# Patient Record
Sex: FEMALE | Race: OTHER | Employment: UNEMPLOYED | URBAN - METROPOLITAN AREA
[De-identification: names, ages, dates, MRNs, and addresses within clinical notes are randomized per-mention and may not be internally consistent; named-entity substitution may affect disease eponyms.]

---

## 2020-10-12 ENCOUNTER — OFFICE VISIT (OUTPATIENT)
Dept: PSYCHIATRY | Facility: CLINIC | Age: 25
End: 2020-10-12
Payer: MEDICAID

## 2020-10-12 VITALS — HEART RATE: 87 BPM | SYSTOLIC BLOOD PRESSURE: 131 MMHG | DIASTOLIC BLOOD PRESSURE: 73 MMHG

## 2020-10-12 DIAGNOSIS — Z51.81 MEDICATION MONITORING ENCOUNTER: ICD-10-CM

## 2020-10-12 DIAGNOSIS — F64.0 GENDER DYSPHORIA IN ADOLESCENT AND ADULT: ICD-10-CM

## 2020-10-12 DIAGNOSIS — F41.1 GENERALIZED ANXIETY DISORDER: ICD-10-CM

## 2020-10-12 DIAGNOSIS — F41.1 GAD (GENERALIZED ANXIETY DISORDER): ICD-10-CM

## 2020-10-12 DIAGNOSIS — F32.1 DEPRESSION, MAJOR, SINGLE EPISODE, MODERATE: Primary | ICD-10-CM

## 2020-10-12 DIAGNOSIS — F40.10 SOCIAL ANXIETY DISORDER: ICD-10-CM

## 2020-10-12 PROCEDURE — 90792 PSYCH DIAG EVAL W/MED SRVCS: CPT | Mod: SA,HB,, | Performed by: NURSE PRACTITIONER

## 2020-10-12 PROCEDURE — 99999 PR PBB SHADOW E&M-NEW PATIENT-LVL II: ICD-10-PCS | Mod: PBBFAC,,, | Performed by: NURSE PRACTITIONER

## 2020-10-12 PROCEDURE — 99999 PR PBB SHADOW E&M-NEW PATIENT-LVL II: CPT | Mod: PBBFAC,,, | Performed by: NURSE PRACTITIONER

## 2020-10-12 PROCEDURE — 99202 OFFICE O/P NEW SF 15 MIN: CPT | Mod: PBBFAC | Performed by: NURSE PRACTITIONER

## 2020-10-12 PROCEDURE — 90792 PR PSYCHIATRIC DIAGNOSTIC EVALUATION W/MEDICAL SERVICES: ICD-10-PCS | Mod: SA,HB,, | Performed by: NURSE PRACTITIONER

## 2020-10-12 RX ORDER — PROPRANOLOL HYDROCHLORIDE 10 MG/1
10 TABLET ORAL 3 TIMES DAILY
Qty: 90 TABLET | Refills: 1 | Status: SHIPPED | OUTPATIENT
Start: 2020-10-12 | End: 2020-12-11

## 2020-10-12 RX ORDER — PROPRANOLOL HYDROCHLORIDE 10 MG/1
10 TABLET ORAL 3 TIMES DAILY
Qty: 90 TABLET | Refills: 11 | Status: SHIPPED | OUTPATIENT
Start: 2020-10-12 | End: 2020-10-12

## 2020-10-12 RX ORDER — SERTRALINE HYDROCHLORIDE 25 MG/1
25 TABLET, FILM COATED ORAL DAILY
Qty: 30 TABLET | Refills: 1 | Status: SHIPPED | OUTPATIENT
Start: 2020-10-12 | End: 2020-10-30

## 2020-10-12 NOTE — PATIENT INSTRUCTIONS
Understanding Anxiety Disorders  Almost everyone gets nervous now and then. Its normal to have knots in your stomach before a test, or for your heart to race on a first date. But an anxiety disorder is much more than a case of nerves. In fact, its symptoms may be overwhelming. But treatment can relieve many of these symptoms. Talking to your healthcare provider is the first step.    What are anxiety disorders?  An anxiety disorder causes intense feelings of panic and fear. These feelings may arise for no apparent reason. And they tend to recur again and again. They may prevent you from coping with life and cause you great distress. As a result, you may avoid anything that triggers your fear. In extreme cases, you may never leave the house. Anxiety disorders may cause other symptoms, such as:  · Obsessive thoughts you cant control  · Constant nightmares or painful thoughts of the past  · Nausea, sweating, and muscle tension  · Trouble sleeping or concentrating  What causes anxiety disorders?  Anxiety disorders tend to run in families. For some people, childhood abuse or neglect may play a role. For others, stressful life events or trauma may trigger anxiety disorders. Anxiety can trigger low self-esteem and poor coping skills.  Common anxiety disorders  · Panic disorder. This causes an intense fear of being in danger.  · Phobias. These are extreme fears of certain objects, places, or events.  · Obsessive-compulsive disorder. This causes you to have unwanted thoughts and urges. You also may perform certain actions over and over.  · Posttraumatic stress disorder. This occurs in people who have survived a terrible ordeal. It can cause nightmares and flashbacks about the event.  · Generalized anxiety disorder. This causes constant worry that can greatly disrupt your life.   Getting better  You may believe that nothing can help you. Or, you might fear what others may think. But most anxiety symptoms can be eased.  Having an anxiety disorder is nothing to be ashamed of. Most people do best with treatment that combines medicine and therapy. These arent cures. But they can help you live a healthier life.  Date Last Reviewed: 2/1/2017 © 2000-2017 [employer]. [address] [place] [address], [place] [address]. All rights reserved. This information is not intended as a substitute for professional medical care. Always follow your healthcare professional's instructions.          Depression  Depression is one of the most common mental health problems today. It is not just a state of unhappiness or sadness. It is a true disease. The cause seems to be related to a decrease in chemicals that transmit signals in the brain. Having a family history of depression, alcoholism, or suicide increases the risk. Chronic illness, chronic pain, migraine headaches and high emotional stress also increase the risk.  Depression is something we tend to recognize in others, but may have a hard time seeing in ourselves. It can show in many physical and emotional ways:  · Loss of appetite  · Over-eating  · Not being able to sleep  · Sleeping too much  · Tiredness not related to physical exertion  · Restlessness or irritability  · Slowness of movement or speech  · Feeling depressed or withdrawn  · Loss of interest in things you once enjoyed  · Trouble concentrating, poor memory, trouble making decisions  · Thoughts of harming or killing oneself, or thoughts that life is not worth living  · Low self-esteem  The treatment for depression may include both medicine and psychotherapy. Antidepressants can reduce suffering and can improve the ability to function during the depressed period. Therapy can offer emotional support and help you understand emotional factors that may be causing the depression.  Home care  · On-going care and support helps people manage this disease.  Find a healthcare provider and therapist who meet your needs. Seek help when you  feel like you may be getting ill.  · Be kind to yourself. Make it a point to do things that you enjoy (gardening, walking in nature, going to a movie, etc.). Reward yourself for small successes.  · Take care of your physical body. Eat a balanced diet (low in saturated fat and high in fruits and vegetables). Exercise at least 3 times a week for 30 minutes. Even mild-moderate exercise (like brisk walking) can make you feel better.  · Avoid alcohol, which can make depression worse.  · Take medicine as prescribed.  · Tell each of your healthcare providers about all of the prescription drugs, over-the-counter medicines, vitamins, and supplements you take. Certain supplements interact with medicines and can result in dangerous side effects. Ask your pharmacist when you have questions about drug interactions.  · Talk with your family and trusted friends about your feelings and thoughts. Ask them to help you recognize behavior changes early so you can get help and, if needed, medicine can be adjusted.  Follow-up care  Follow up with your healthcare provider, or as advised.  Call 911  Call 911 if you:  · Have suicidal thoughts, a suicide plan, and the means to carry out the plan  · Have trouble breathing  · Are very confused  · Feel very drowsy or have trouble awakening  · Faint or lose consciousness  · Have new chest pain that becomes more severe, lasts longer, or spreads into your shoulder, arm, neck, jaw or back  When to seek medical advice  Call your healthcare provider right away if any of these occur:  · Feeling extreme depression, fear, anxiety, or anger toward yourself or others  · Feeling out of control  · Feeling that you may try to harm yourself or another  · Hearing voices that others do not hear  · Seeing things that others do not see  · Cant sleep or eat for 3 days in a row  · Friends or family express concern over your behavior and ask you to seek help  Date Last Reviewed: 9/29/2015  © 1458-4626 [employer]. [address] [place] [address], [place] [address]. All rights reserved. This information is not intended as a substitute for professional medical care. Always follow your healthcare professional's instructions.

## 2020-10-12 NOTE — PROGRESS NOTES
"Outpatient Psychiatry Initial Visit (MD/NP)      Disclaimer: Evaluation and treatment is based on information presented to date. Any new information may affect assessment and findings.         Note: I reviewed Epic EHR_"Encounters" Info for general background info.     reviewed this date            10/12/2020    Tiago Laureano, a 25 y.o. female, presenting for initial evaluation visit. Met with patient.    Reason for Encounter: self-referral. " depression and anxiety"    History of Present Illness:  Depression: endorses that she has been transgender since the start of puberty.  She is wanting to have surgeries and take next steps to become male gender.  Came out to family recently.  They were accepting, " confused"      States they are not hostile.  Her sister is spoortive;  And understanding.   She has searched for medical facilities. Doesn't know where to start.  States as teen she had an incongruence with gender and this has lasted through adulthood.  Strong desire to be rid of her sex characteristics.  incongurence with primary characteristics.  Desire for sex characteristics of being a male.  Desire to be male gender.     Depression: days where she does not want to get out of bed; fatigue, " no interested in anything"  Anhedonia; want to stay at home all of the time.  Verbalizes social anxiety - " marciano always been a bit of an introverted person"  Begins to cry.  Low self esteem, feels worthless " sometimes"  Does not endorse hopelessness.  Denies suicidal or homicidal ideations.    Anxiety:  " I feel choked up; feel it's hard time, and I just want to go into myself"  Hyperarousal sx, worrying all of the time. Panic attacks; caused her to drop out of school; problems concentrating and interrupts sleep, waking her up with panic attack.   Went to 9th grade; she is working on her GED  Not working at job    She does not have close relationship with mom " she just came back into my life"  Grandmother raised " "her.  Mother is controlling and " tries to take over my care and everything"    Mother brought her to this visit.                  Symptom Clusters:  Depressive Disorder: REPORTS depressed mood, sleep change, tired/fatigued, worthlessness, guilt, concentration problems.   Anxiety Disorder: panic attacks, hyperarousal symptoms, muscle tension, sleep problems, concentration problems, excessive worry.   Manic Disorder: DENIES none.   Psychotic Disorder: DENIES none.   Substance Use:  DENIES none.   Physical or Sexual Abuse: DENIES none.       Review Of Systems:     Review of Systems   Constitutional: Negative.  Negative for activity change, appetite change and unexpected weight change.   HENT: Negative.    Eyes: Negative.  Negative for visual disturbance.   Respiratory: Negative.    Cardiovascular: Negative.    Gastrointestinal: Negative.  Negative for constipation, diarrhea and nausea.   Endocrine: Negative.    Genitourinary: Negative.    Musculoskeletal: Negative.    Skin: Negative.  Negative for color change.   Allergic/Immunologic: Negative.  Negative for food allergies.   Neurological: Negative.  Negative for dizziness, tremors, seizures, speech difficulty, weakness, light-headedness and headaches.   Hematological: Negative.    Psychiatric/Behavioral: Positive for dysphoric mood and sleep disturbance. Negative for agitation, behavioral problems, confusion, decreased concentration, hallucinations, self-injury and suicidal ideas. The patient is nervous/anxious. The patient is not hyperactive.        Current Evaluation:           Constitutional  Vitals:  Most recent vital signs, dated less than 90 days prior to this appointment, were reviewed.      Last 3 sets of Vitals    Vitals - 1 value per visit 10/12/2020   SYSTOLIC 131   DIASTOLIC 73   PULSE 87   VISIT REPORT -       General:  unremarkable, age appropriate, casually dressed, neatly groomed     Musculoskeletal  Muscle Strength/Tone:  not examined   Gait & " "Station:  non-ataxic     Psychiatric  Speech:  no latency; no press  Anxious, voice quivering when talking   Mood & Affect:  depressed, sad anxious    appropriate, anxious   Thought Process:  normal and logical, abstract, goal-directed   Associations:  intact   Thought Content:  normal, no suicidality, no homicidality, delusions, or paranoia   Insight:  intact, has awareness of illness   Judgement: behavior is adequate to circumstances, age appropriate   Orientation:  grossly intact, person, place, situation, time/date, day of week, month of year, year   Memory: intact for content of interview, able to remember recent events- yes, able to remember remote events- yes   Language: grossly intact   Attention Span & Concentration:  distracted   Fund of Knowledge:  intact and appropriate to age and level of education, familiar with aspects of current personal life       Relevant Elements of Neurological Exam: normal gait        Psychiatric history:   none    Previous Suicide Attempt: denies    Previous Psychotropic Medications: denies    Medical history: denies      Family history of psychiatric illness: " I don't know, something is wrong with my mom, she acts all over the place"      Social history (marriage, employment, etc.): from .  Raised by grandmother.  Spent most of time with grandmother.  She has reconnected with mother. Not  no children.  Not working.  [employer]  9th grade dropped out of school bc of anxiety.  Mother was not in her life until recently.  She has issues with mom.  Argues with her, controlling        Legal: denies      History of Physical or Sexual Abuse:  denies history or current physical/sexual abuse      Support System: family, close friends, significant other    Substance Abuse History:    Use of Alcohol: drinks socially, no concerns  Denies other substances      Laboratory Data  No visits with results within 1 Month(s) from this visit.   Latest known visit with results is:   Orders " Only on 04/05/2013   Component Date Value Ref Range Status    Strep A Culture 04/05/2013 NEGATIVE FOR GROUP A STREP   Final         Medications  Outpatient Encounter Medications as of 10/12/2020   Medication Sig Dispense Refill    propranoloL (INDERAL) 10 MG tablet Take 1 tablet (10 mg total) by mouth 3 (three) times daily. 90 tablet 1    sertraline (ZOLOFT) 25 MG tablet Take 1 tablet (25 mg total) by mouth once daily. 30 tablet 1    [DISCONTINUED] propranoloL (INDERAL) 10 MG tablet Take 1 tablet (10 mg total) by mouth 3 (three) times daily. 90 tablet 11     No facility-administered encounter medications on file as of 10/12/2020.              Assessment - Diagnosis - Goals:     Impression:     Encounter Diagnoses   Name Primary?    Social anxiety disorder     Depression, major, single episode, moderate Yes    Generalized anxiety disorder     Medication monitoring encounter     PAUL (generalized anxiety disorder)     Gender dysphoria in adolescent and adult          Strengths and Liabilities: Strength: Patient accepts guidance/feedback, Strength: Patient is expressive/articulate., Strength: Patient is intelligent., Strength: Patient is motivated for change., Strength: Patient is physically healthy., Strength: Patient has reasonable judgment., Strength: Patient is stable., Liability: Patient lacks coping skills.    Treatment Goals:  Specify outcomes written in observable, behavioral terms:   Anxiety: reducing negative automatic thoughts, reducing physical symptoms of anxiety and reducing time spent worrying (<30 minutes/day)  Depression: increasing energy, increasing interest in usual activities, increasing self-reward for positive behaviors (one/day), reducing excessive guilt and reducing negative automatic thoughts    Treatment Plan/Recommendations:   · Medication Management: The risks and benefits of medication were discussed with the patient.  · Referral for further treatment to social work team for  psychotherapy  · Labs: see orders  · The treatment plan and follow up plan were reviewed with the patient.   · Start zoloft 25 mg  · Propranolol 10 mg tid prn    Depression, major, single episode, moderate  -     sertraline (ZOLOFT) 25 MG tablet; Take 1 tablet (25 mg total) by mouth once daily.  Dispense: 30 tablet; Refill: 1    Social anxiety disorder  -     sertraline (ZOLOFT) 25 MG tablet; Take 1 tablet (25 mg total) by mouth once daily.  Dispense: 30 tablet; Refill: 1  -     Discontinue: propranoloL (INDERAL) 10 MG tablet; Take 1 tablet (10 mg total) by mouth 3 (three) times daily.  Dispense: 90 tablet; Refill: 11  -     propranoloL (INDERAL) 10 MG tablet; Take 1 tablet (10 mg total) by mouth 3 (three) times daily.  Dispense: 90 tablet; Refill: 1    Generalized anxiety disorder  -     sertraline (ZOLOFT) 25 MG tablet; Take 1 tablet (25 mg total) by mouth once daily.  Dispense: 30 tablet; Refill: 1    Medication monitoring encounter  -     Comprehensive Metabolic Panel; Future; Expected date: 10/12/2020  -     CBC auto differential; Future; Expected date: 10/19/2020  -     DRUGS OF ABUSE SCREEN, BLOOD; Future; Expected date: 10/12/2020  -     Pregnancy, urine rapid  -     TSH; Future; Expected date: 10/12/2020  -     Lipid Panel; Future; Expected date: 10/12/2020    PAUL (generalized anxiety disorder)    Gender dysphoria in adolescent and adult      .    Return to Clinic: 1 week    Reviewed pathophysiology of depression and anxiety. Discussed rationale behind treatment. Reviewed treatment options, including medication and psychotherapy. Reviewed pharmacology including onset of action, dosing, side effects, adverse reactions and duration of therapy (6-12 months).  Discussed the need to stay on medication if it is effective and not discontinue after a few weeks due to the probability of relapse.    Pt expressed appreciation for the visit today and did not have further question at this time though pt  was still  informed to:     Call / Walk In if problems.    Call Report Side Effects to Psyc MD     Encouraged to follow up with primary care / Gen Med MD for continued monitoring of general health and wellness.    Call 911  Or go to ER if Acute Concerns (especially if any thoughts of harm to self or other).     Understanding was expressed; and no further concerns nor questions were raised at this time.

## 2020-10-26 ENCOUNTER — TELEPHONE (OUTPATIENT)
Dept: PSYCHIATRY | Facility: CLINIC | Age: 25
End: 2020-10-26

## 2020-10-26 NOTE — TELEPHONE ENCOUNTER
----- Message from Marcathysanjuanita  sent at 10/26/2020  4:25 PM CDT -----  Type:  Sooner Apoointment Request    Caller is requesting a sooner appointment.  Caller declined first available appointment listed below.  Caller will not accept being placed on the waitlist and is requesting a message be sent to doctor.  Name of Caller:Pt  When is the first available appointment?  Symptoms:  Would the patient rather a call back or a response via [facility]?   Best Call Back Number: [phone]  Additional Information:   Pt is requesting appt for Friday

## 2020-10-26 NOTE — TELEPHONE ENCOUNTER
Pt calling in regard to keeping an appt with Angie Mroe tomorrow.  Pt states she spoke with Sara today at the clinic and Sara was going to call her back re this issue.  I have left a msg for Sara to call pt in the morning as she is already gone for the day.

## 2020-10-26 NOTE — TELEPHONE ENCOUNTER
----- Message from Marcathysanjuanita  sent at 10/26/2020  4:25 PM CDT -----  Type:  Sooner Apoointment Request    Caller is requesting a sooner appointment.  Caller declined first available appointment listed below.  Caller will not accept being placed on the waitlist and is requesting a message be sent to doctor.  Name of Caller:Pt  When is the first available appointment?  Symptoms:  Would the patient rather a call back or a response via [facility]?   Best Call Back Number: [phone]  Additional Information:   Pt is requesting appt for Friday

## 2020-10-26 NOTE — TELEPHONE ENCOUNTER
Attempted to reschedule today's appointment due to provider having an emergency. No answer detailed voice mail left. No my chart setup.

## 2020-10-26 NOTE — TELEPHONE ENCOUNTER
"Patient and her mother came into the clinic. This nurse attempted to contact the patient earlier without success to let her know the provider wasn't available,  my chart not setup. Patient's mother stated that' I am sick and tired of this [place] woman she is never at work, she prescribed my daughter some kind of blood pressure medicine that doesn't work, she doesn't have high blood pressure. I don't like that woman". Patient quiet with head down. Patient's mother stated that" Your grandmother wanted you to come here". This nurse offered the patient and her mother a different appointment, mother refused. Patient quiet. Mother speaking in a nasty tone of voice. Refused any type of reassurance per this nurse. Patient's mother stated that". Let's go [place] we are going to find you somewhere else to go"  Patient leaving the waiting area quiet, waved this nurse bye, and left with mother. This nurse notified [facility] clinical supervisor of the incident.   "

## 2020-10-27 ENCOUNTER — TELEPHONE (OUTPATIENT)
Dept: PSYCHIATRY | Facility: CLINIC | Age: 25
End: 2020-10-27

## 2020-10-27 NOTE — TELEPHONE ENCOUNTER
"This nurse spoke to the patient in regards to switching providers due to patient's mother not wanting the patient to see this current provider. Patient stated that" I want to see Ms. Colón you can reschedule me and in office visit with her". The patient's grandmother talking in the background stated that" My granddaughter wants to see Dr. Colón". Patient appointment rescheduled for this Friday morning. This nurse asked the patient about rescheduling with CHAITANYA Adams, now or on Friday. Patient stated that" I will reschedule the appointment when I come in on Friday morning". Patient speaking in calm tone of voice, grandmother in the background saying thank you in a calm tone of voice. No other complaints verbalized at this time. [facility] clinical supervisor made aware of issues with the family dynamics of the patient's mother and grandmother. The patient is own responsible party.   "

## 2020-10-27 NOTE — TELEPHONE ENCOUNTER
Patient was seen for initial visit on 10/12 with NP and patient.  Patient appeared to be pleased with care. The patient is of age where she can choose her provider. Patient did speak to family dynamic at first evaluation and concerns about mother being controlling. She is to follow with a therapist and these issues can also be addressed in therapy.

## 2020-10-30 ENCOUNTER — OFFICE VISIT (OUTPATIENT)
Dept: PSYCHIATRY | Facility: CLINIC | Age: 25
End: 2020-10-30
Payer: MEDICAID

## 2020-10-30 ENCOUNTER — TELEPHONE (OUTPATIENT)
Dept: PSYCHIATRY | Facility: CLINIC | Age: 25
End: 2020-10-30

## 2020-10-30 DIAGNOSIS — F32.1 DEPRESSION, MAJOR, SINGLE EPISODE, MODERATE: ICD-10-CM

## 2020-10-30 DIAGNOSIS — F41.1 GENERALIZED ANXIETY DISORDER: Primary | ICD-10-CM

## 2020-10-30 PROCEDURE — 99999 PR PBB SHADOW E&M-EST. PATIENT-LVL I: ICD-10-PCS | Mod: PBBFAC,,, | Performed by: NURSE PRACTITIONER

## 2020-10-30 PROCEDURE — 99211 OFF/OP EST MAY X REQ PHY/QHP: CPT | Mod: PBBFAC | Performed by: NURSE PRACTITIONER

## 2020-10-30 PROCEDURE — 99999 PR PBB SHADOW E&M-EST. PATIENT-LVL I: CPT | Mod: PBBFAC,,, | Performed by: NURSE PRACTITIONER

## 2020-10-30 PROCEDURE — 99214 PR OFFICE/OUTPT VISIT, EST, LEVL IV, 30-39 MIN: ICD-10-PCS | Mod: SA,HB,S$PBB, | Performed by: NURSE PRACTITIONER

## 2020-10-30 PROCEDURE — 99214 OFFICE O/P EST MOD 30 MIN: CPT | Mod: SA,HB,S$PBB, | Performed by: NURSE PRACTITIONER

## 2020-10-30 RX ORDER — SERTRALINE HYDROCHLORIDE 100 MG/1
100 TABLET, FILM COATED ORAL DAILY
Qty: 30 TABLET | Refills: 0 | Status: SHIPPED | OUTPATIENT
Start: 2020-10-30 | End: 2020-11-17 | Stop reason: SDUPTHER

## 2020-10-30 NOTE — PATIENT INSTRUCTIONS
Understanding Anxiety Disorders  Almost everyone gets nervous now and then. Its normal to have knots in your stomach before a test, or for your heart to race on a first date. But an anxiety disorder is much more than a case of nerves. In fact, its symptoms may be overwhelming. But treatment can relieve many of these symptoms. Talking to your healthcare provider is the first step.    What are anxiety disorders?  An anxiety disorder causes intense feelings of panic and fear. These feelings may arise for no apparent reason. And they tend to recur again and again. They may prevent you from coping with life and cause you great distress. As a result, you may avoid anything that triggers your fear. In extreme cases, you may never leave the house. Anxiety disorders may cause other symptoms, such as:  · Obsessive thoughts you cant control  · Constant nightmares or painful thoughts of the past  · Nausea, sweating, and muscle tension  · Trouble sleeping or concentrating  What causes anxiety disorders?  Anxiety disorders tend to run in families. For some people, childhood abuse or neglect may play a role. For others, stressful life events or trauma may trigger anxiety disorders. Anxiety can trigger low self-esteem and poor coping skills.  Common anxiety disorders  · Panic disorder. This causes an intense fear of being in danger.  · Phobias. These are extreme fears of certain objects, places, or events.  · Obsessive-compulsive disorder. This causes you to have unwanted thoughts and urges. You also may perform certain actions over and over.  · Posttraumatic stress disorder. This occurs in people who have survived a terrible ordeal. It can cause nightmares and flashbacks about the event.  · Generalized anxiety disorder. This causes constant worry that can greatly disrupt your life.   Getting better  You may believe that nothing can help you. Or, you might fear what others may think. But most anxiety symptoms can be eased.  Having an anxiety disorder is nothing to be ashamed of. Most people do best with treatment that combines medicine and therapy. These arent cures. But they can help you live a healthier life.  Date Last Reviewed: 2/1/2017  © 4681-4328 [employer]. [address] [place] [address], [place] [address]. All rights reserved. This information is not intended as a substitute for professional medical care. Always follow your healthcare professional's instructions.

## 2020-10-30 NOTE — PROGRESS NOTES
"Outpatient Psychiatry Follow-Up Visit (MD/NP)      Disclaimer: Evaluation and treatment is based on information presented to date. Any new information may affect assessment and findings.     Crisis Disclaimer: Patient was informed that due to the virtual nature of the visit, that if a crisis develops, protocols will be implemented to ensure patient safety, including but not limited to: 1) Initiating a welfare check with local Law Enforcement, 2) Calling 911/National Crisis Hotline, and/or 3) Initiating PEC/CEC procedures.         Clinical Status of Patient:  Outpatient (Ambulatory)    Chief Complaint:  Tiago Laureano is a 25 y.o. female who presents today for follow-up of depression and anxiety.  Met with patient.      Interval History and Content of Current Session:  Interim Events/Subjective Report/Content of Current Session: Patient's appt was rescheduled, mother came with her at that time and made a scene, behaving inappropriately.  Patient started this visit by apologizing.  Explained to patient she did not have to apologize for mother's behavior, that she is the patient and nothing will ever be discussed with mother.  Mother was not in patient's life until recently.  Raised by grandmother.   patient has worsened since last visit. Currently not working or attending school.  Does not want to go to school. Does not have GED.  She was unable to complete school due to anxiety. States she stays home, does not go anywhere even prior to virus.  She had a family function and did not want to go bc she was so anxious.  Took prorpanolol and states it helped her.  Has been using this only when she has to go out.  States she took one this morning to come to office.  States she does not have side effects from inderal.  States the zoloft makes her a little sedated.  Discussed moving to the night time.  Will increase at this time as she continues to have high anxiety.    States she " lays around all day, has online " "friends" she talks to but has never met due to anxiety.  She wants to get a job but has been unable to do so.  Has never worked.  She feels her anxiety is related to her gender issues as she had no trauma in the past.  States she sleeps during night and during day.    She did not meet with therapist due to issues with mother.    Continues with depressive sx.    Denies suicidal or homicidal ideations at this time.      Depressive Disorder: REPORTS depressed mood, worthlessness, guilt, concentration problems.   Anxiety Disorder: panic attacks, hyperarousal symptoms, phobia, muscle tension, sleep problems, agoraphobia, concentration problems, excessive worry, avoidance symptoms.   Manic Disorder: DENIES none.   Psychotic Disorder: DENIES none.   Substance Use:  DENIES none.   Physical or Sexual Abuse: DENIES none.       Review of Systems   · PSYCHIATRIC: Pertinant items are noted in the narrative.    Past Medical, Family and Social History: The patient's past medical, family and social history have been reviewed and updated as appropriate within the electronic medical record - see encounter notes.    Compliance: yes    Side effects: somnolence    Risk Parameters:  Patient reports no suicidal ideation  Patient reports no homicidal ideation  Patient reports no self-injurious behavior  Patient reports no violent behavior    Exam (detailed: at least 9 elements; comprehensive: all 15 elements)   Constitutional  Vitals:  Most recent vital signs, dated less than 90 days prior to this appointment, were reviewed.   Last 3 sets of Vitals    Vitals - 1 value per visit 10/12/2020   SYSTOLIC 131   DIASTOLIC 73   PULSE 87   VISIT REPORT -          General:  unremarkable, age appropriate, casually dressed, neatly groomed     Musculoskeletal  Muscle Strength/Tone:  not examined   Gait & Station:  non-ataxic     Psychiatric  Speech:  no latency; no press   Mood & Affect:  anxious, depressed  blunted   Thought Process:  normal and " logical, abstract, goal-directed   Associations:  intact   Thought Content:  normal, no suicidality, no homicidality, delusions, or paranoia   Insight:  intact, has awareness of illness   Judgement: behavior is adequate to circumstances, age appropriate   Orientation:  grossly intact   Memory: intact for content of interview, able to remember recent events- yes, able to remember remote events- yes   Language: grossly intact   Attention Span & Concentration:  able to focus   Fund of Knowledge:  intact and appropriate to age and level of education, familiar with aspects of current personal life     Assessment and Diagnosis   Status/Progress: Based on the examination today, the patient's problem(s) is/are worsening.  New problems have been presented today.   social and environmental stressors with mother are complicating management of the primary condition.  There are no active rule-out diagnoses for this patient at this time.     General Impression:     Encounter Diagnoses   Name Primary?    Depression, major, single episode, moderate     Generalized anxiety disorder Yes         Intervention/Counseling/Treatment Plan   · Medication Management: The risks and benefits of medication were discussed with the patient.  · Increase zoloft to 50 mg x 1 week  · Continue current meds  · Refer for therapy, CBT  · Rule out agoraphobia    Reviewed pathophysiology of depression and anxiety. Discussed rationale behind treatment. Reviewed treatment options, including medication and psychotherapy. Reviewed pharmacology including onset of action, dosing, side effects, adverse reactions and duration of therapy (6-12 months).  Discussed the need to stay on medication if it is effective and not discontinue after a few weeks due to the probability of relapse.    Pt expressed appreciation for the visit today and did not have further question at this time though pt  was still informed to:     Call / Walk In if problems.    Call Report Side  Effects to Psyc MD     Encouraged to follow up with primary care / Gen Med MD for continued monitoring of general health and wellness.    Call 911  Or go to ER if Acute Concerns (especially if any thoughts of harm to self or other).     Understanding was expressed; and no further concerns nor questions were raised at this time.        Return to Clinic: 1 week

## 2020-10-30 NOTE — TELEPHONE ENCOUNTER
"Patient present in community waiting area with mother. This nurse and patient in process of setting up my chart. The patient's mother on the phone stated that" Yeah I am in here with Tiago that woman is suppose to be helping her setup my chart, but she act like she doesn't know what she's doing". Mother speaking in a negative tone of voice. During the entire process of this nurse assisting the patient to setup my chart, and schedule patient the mother was very disruptive, and hurrying the patient. All appointments scheduled. Patient stated that" Sorry about my mom and how she is acting". Patient leaving with mother, patient quiet and calm. The patient's mother was questioning the patient rudely about her visit with the NP. This nurse spoke to [facility] clinical supervisor and notified him of the above information. Supervisor stated that" We will get the patient to sign a form that allows her mother to be involved in her care.   "

## 2020-10-30 NOTE — TELEPHONE ENCOUNTER
" On this date at 10:02 A.M. The patient's mother returned to the 3rd floor psychiatry clinic waiting area. Rushed in the room pointing her finger at this nurse other patients present.   Speaking in a very loud, belligerent, and aggressive tone of voice. She  stated that" You get that fucking doctor out here who prescribed my daughter that [place] blood pressure medicine, my  daughter got out  of my car and [place]". This nurse attempted to reassure the mother, that  attempt was unsuccessful. The mother stated that" I am going to wait for her a** in the hallway, you better go get her". [place] [phone] called per this nurse at this time.  Security given location of incident, and details of what was transpiring. Security personnel stated that" Okay I am going to send someone up there". The mother is present in the hallway talking loudly on the phone. This nurse notified Racheal Colón NP of the incident. Nurse practitioner stated that" I am not coming out there call security". This nurse notified her that security was called. This nurse notified [facility] Clinical supervisor of the incident. While walking back to the office the patient's mother was  present in the hallway continuing to speak loudly and remained belligerent. Patients  and other clinic staff observing this behavior. The patient's mother approached this nurse still pointing her finger stating that" Nothing better not fucking happen  to my daughter cause if it does that woman is going to pay". Several patients present in main lobby waiting area. The mother left and got on the elevator. Security still not present. On this date at 10:08 A.m. Security present on third floor this nurse gave details of incident and a description of the mother. Security leaving 3rd floor on elevator to locate the mother. On this date at 10:10 A.M. this nurse spoke to Deputy Hansen at the [facility] office notified him of the incident and " "requested a wellness check to be done. Description of patient given.  Deputy Hansen stated that" We will put a Kofi out on her, so that means we will dispatch some officers to go out and check along the interstate [place]".  On this date at 10:34 A.M. Deputy Hansen called and stated that" We sent some deputies out to look for her, no response as of yet that they saw her, we will be communicating with the security at [facility], and they can give you and update".  On this date at 10:36 A.M. [facility] clinical Supervisor made aware of NERIS carr response. This nurse apologized to all patients who were present. The patients were calm, and stated that they understood it wasn't my fault.   "

## 2020-10-31 ENCOUNTER — PATIENT MESSAGE (OUTPATIENT)
Dept: PSYCHIATRY | Facility: CLINIC | Age: 25
End: 2020-10-31

## 2020-11-06 ENCOUNTER — PATIENT MESSAGE (OUTPATIENT)
Dept: PSYCHIATRY | Facility: CLINIC | Age: 25
End: 2020-11-06

## 2020-11-12 ENCOUNTER — OFFICE VISIT (OUTPATIENT)
Dept: PSYCHIATRY | Facility: CLINIC | Age: 25
End: 2020-11-12
Payer: MEDICAID

## 2020-11-12 DIAGNOSIS — F64.0 GENDER DYSPHORIA IN ADOLESCENT AND ADULT: ICD-10-CM

## 2020-11-12 DIAGNOSIS — F40.10 SOCIAL ANXIETY DISORDER: ICD-10-CM

## 2020-11-12 DIAGNOSIS — F41.1 GENERALIZED ANXIETY DISORDER: ICD-10-CM

## 2020-11-12 DIAGNOSIS — F32.1 DEPRESSION, MAJOR, SINGLE EPISODE, MODERATE: Primary | ICD-10-CM

## 2020-11-12 PROCEDURE — 99999 PR PBB SHADOW E&M-EST. PATIENT-LVL I: ICD-10-PCS | Mod: PBBFAC,,, | Performed by: SOCIAL WORKER

## 2020-11-12 PROCEDURE — 90791 PSYCH DIAGNOSTIC EVALUATION: CPT | Mod: AJ,HB,, | Performed by: SOCIAL WORKER

## 2020-11-12 PROCEDURE — 99211 OFF/OP EST MAY X REQ PHY/QHP: CPT | Mod: PBBFAC | Performed by: SOCIAL WORKER

## 2020-11-12 PROCEDURE — 90791 PR PSYCHIATRIC DIAGNOSTIC EVALUATION: ICD-10-PCS | Mod: AJ,HB,, | Performed by: SOCIAL WORKER

## 2020-11-12 PROCEDURE — 99999 PR PBB SHADOW E&M-EST. PATIENT-LVL I: CPT | Mod: PBBFAC,,, | Performed by: SOCIAL WORKER

## 2020-11-12 NOTE — PROGRESS NOTES
Psychiatry Initial Visit (PhD/LCSW)  Diagnostic Interview - CPT 08739    Date: 11/12/2020    Site: [facility]    Referral source: Pt referred for counseling by Racheal Colón NP.    Clinical status of patient: Outpatient    Tiago Laureano, a 25 y.o. female, for initial evaluation visit.  Met with patient.    Chief complaint/reason for encounter: depression and anxiety    History of present illness: Pt reports feeling depressed all of her life.  Despite that, this is the first time that she has ever reached out for mental health care.  Pt stated that she also has debilitating anxiety and social phobia.  She stated that she has never been able to speak to people well and was never able to make friends.  Pt has difficulty leaving the house due to her anxiety.  She does not work and dropped out of high school in 9th grade.  Pt was tearful throughout the interview.  She stated that she took an anxiety pill to be able to tolerate coming in to meet with me today.  Pt reports feeling that she is transgender.  She recently came out to her family regarding this desire.  She felt that counseling would be the first step in moving towards transitioning which is why she has come to care now as well.  Pt stated she became increasingly aware of these feelings when she started puberty.      Pain: 0    Symptoms:   · Mood: depressed mood, diminished interest, hypersomnia, worthlessness/guilt, poor concentration, tearfulness and social isolation  · Anxiety: excessive anxiety/worry, restlessness/keyed up and social phobia  · Substance abuse: denied  · Cognitive functioning: denied  · Health behaviors: noncontributory    Psychiatric history: currently under psychiatric care    Medical history: none    Family history of psychiatric illness: not known    Social history (marriage, employment, etc.): Pt stated that she dropped out of high school in the middle of 9th grade.  Her intent was to home school at that time but then she and  "her grandmother "never got around to it."  Pt was raised by her maternal grandmother.  She has a 1/2 sister that is older than she who was also raised by their grandmother but because of their age difference she has no memories of a childhood with her sister.  Pt does not know who her father is.  Pt has seen her mother over the years but she has never had an active role in her life.  Recently her mother acted out when she brought her here to meet NP for psychotropic medication evaluation.  She stated she has not spoken with her mom since then.  When asked three wishes patient really struggled to come up with three things she could wish for.  Eventually she stated being able to transition and "getiing my life together."  When I asked what that looked like for her she could not qualify it in any way.  I asked patient if she has ever felt hope.  She stated she felt "hope" for the first time when she told her grandmother, sister and mother that she was transgender.  She stated my sister was really supportive and my mother and grandmother were "not hostile" so that felt like a positive to her.  I encouraged patient to attend transgender support groups and provided her with those resources.  She seemed to think she may start with one that is on line as that may be less anxiety producing for her.  She has not yet considered a male name as she stated there given name is general neutral so she had not given it much thought yet.  Pt denied any abuse or neglect. Pt stated that she was a slow learner and kept getting further and further behind in school until she ultimately stopped going.  Her only friends are through on-line vikas and ex box but she can interact that way without anxiety.      Substance use:   Alcohol: none   Drugs: none   Tobacco: none   Caffeine: none    Current medications and drug reactions (include OTC, herbal): see medication list       Strengths and liabilities: Strength: Patient accepts " guidance/feedback, Strength: Patient is expressive/articulate., Strength: Patient is motivated for change., Strength: Patient is physically healthy., Liability: Patient is dependent., Liability: Patient lacks social skills., Liability: Patient has no suport network., Liability: Patient has possible cognitive impairment., Liability: Patient lacks coping skills.    Current Evaluation:     Mental Status Exam:  General Appearance:  unremarkable, age appropriate   Speech: normal tone, normal rate, normal pitch, normal volume      Level of Cooperation: cooperative      Thought Processes: normal and logical   Mood: depressed      Thought Content: normal, no suicidality, no homicidality, delusions, or paranoia   Affect: congruent and appropriate   Orientation: Oriented x3   Memory: intact   Attention Span & Concentration: intact   Fund of General Knowledge: intact and appropriate to age and level of education   Abstract Reasoning: not assessed   Judgment & Insight: fair     Language  intact     Diagnostic Impression - Plan:       ICD-10-CM ICD-9-CM   1. Depression, major, single episode, moderate  F32.1 296.22   2. Social anxiety disorder  F40.10 300.23   3. Generalized anxiety disorder  F41.1 300.02   4. Gender dysphoria in adolescent and adult  F64.0 302.85       Plan:individual psychotherapy    Return to Clinic: 2 weeks    Length of Service (minutes): 45     Angie More   11/12/2020   4:26 PM

## 2020-11-17 ENCOUNTER — OFFICE VISIT (OUTPATIENT)
Dept: PSYCHIATRY | Facility: CLINIC | Age: 25
End: 2020-11-17
Payer: MEDICAID

## 2020-11-17 DIAGNOSIS — F32.1 DEPRESSION, MAJOR, SINGLE EPISODE, MODERATE: Primary | ICD-10-CM

## 2020-11-17 DIAGNOSIS — F41.1 GENERALIZED ANXIETY DISORDER: ICD-10-CM

## 2020-11-17 PROCEDURE — 99214 OFFICE O/P EST MOD 30 MIN: CPT | Mod: 95,SA,HB, | Performed by: NURSE PRACTITIONER

## 2020-11-17 PROCEDURE — 99214 PR OFFICE/OUTPT VISIT, EST, LEVL IV, 30-39 MIN: ICD-10-PCS | Mod: 95,SA,HB, | Performed by: NURSE PRACTITIONER

## 2020-11-17 RX ORDER — SERTRALINE HYDROCHLORIDE 50 MG/1
50 TABLET, FILM COATED ORAL DAILY
Qty: 30 TABLET | Refills: 2 | Status: SHIPPED | OUTPATIENT
Start: 2020-11-17 | End: 2021-02-15

## 2020-11-17 RX ORDER — SERTRALINE HYDROCHLORIDE 100 MG/1
100 TABLET, FILM COATED ORAL DAILY
Qty: 30 TABLET | Refills: 0 | Status: SHIPPED | OUTPATIENT
Start: 2020-11-17 | End: 2020-12-17

## 2020-11-17 NOTE — PATIENT INSTRUCTIONS
Depression  Depression is one of the most common mental health problems today. It is not just a state of unhappiness or sadness. It is a true disease. The cause seems to be related to a decrease in chemicals that transmit signals in the brain. Having a family history of depression, alcoholism, or suicide increases the risk. Chronic illness, chronic pain, migraine headaches and high emotional stress also increase the risk.  Depression is something we tend to recognize in others, but may have a hard time seeing in ourselves. It can show in many physical and emotional ways:  · Loss of appetite  · Over-eating  · Not being able to sleep  · Sleeping too much  · Tiredness not related to physical exertion  · Restlessness or irritability  · Slowness of movement or speech  · Feeling depressed or withdrawn  · Loss of interest in things you once enjoyed  · Trouble concentrating, poor memory, trouble making decisions  · Thoughts of harming or killing oneself, or thoughts that life is not worth living  · Low self-esteem  The treatment for depression may include both medicine and psychotherapy. Antidepressants can reduce suffering and can improve the ability to function during the depressed period. Therapy can offer emotional support and help you understand emotional factors that may be causing the depression.  Home care  · On-going care and support helps people manage this disease.  Find a healthcare provider and therapist who meet your needs. Seek help when you feel like you may be getting ill.  · Be kind to yourself. Make it a point to do things that you enjoy (gardening, walking in nature, going to a movie, etc.). Reward yourself for small successes.  · Take care of your physical body. Eat a balanced diet (low in saturated fat and high in fruits and vegetables). Exercise at least 3 times a week for 30 minutes. Even mild-moderate exercise (like brisk walking) can make you feel better.  · Avoid alcohol, which can make  depression worse.  · Take medicine as prescribed.  · Tell each of your healthcare providers about all of the prescription drugs, over-the-counter medicines, vitamins, and supplements you take. Certain supplements interact with medicines and can result in dangerous side effects. Ask your pharmacist when you have questions about drug interactions.  · Talk with your family and trusted friends about your feelings and thoughts. Ask them to help you recognize behavior changes early so you can get help and, if needed, medicine can be adjusted.  Follow-up care  Follow up with your healthcare provider, or as advised.  Call 911  Call 911 if you:  · Have suicidal thoughts, a suicide plan, and the means to carry out the plan  · Have trouble breathing  · Are very confused  · Feel very drowsy or have trouble awakening  · Faint or lose consciousness  · Have new chest pain that becomes more severe, lasts longer, or spreads into your shoulder, arm, neck, jaw or back  When to seek medical advice  Call your healthcare provider right away if any of these occur:  · Feeling extreme depression, fear, anxiety, or anger toward yourself or others  · Feeling out of control  · Feeling that you may try to harm yourself or another  · Hearing voices that others do not hear  · Seeing things that others do not see  · Cant sleep or eat for 3 days in a row  · Friends or family express concern over your behavior and ask you to seek help  Date Last Reviewed: 9/29/2015  © 0703-8050 [employer]. [address] [place] [id number]. All rights reserved. This information is not intended as a substitute for professional medical care. Always follow your healthcare professional's instructions.          Understanding Anxiety Disorders  Almost everyone gets nervous now and then. Its normal to have knots in your stomach before a test, or for your heart to race on a first date. But an anxiety disorder is much more than a case of nerves. In  fact, its symptoms may be overwhelming. But treatment can relieve many of these symptoms. Talking to your healthcare provider is the first step.    What are anxiety disorders?  An anxiety disorder causes intense feelings of panic and fear. These feelings may arise for no apparent reason. And they tend to recur again and again. They may prevent you from coping with life and cause you great distress. As a result, you may avoid anything that triggers your fear. In extreme cases, you may never leave the house. Anxiety disorders may cause other symptoms, such as:  · Obsessive thoughts you cant control  · Constant nightmares or painful thoughts of the past  · Nausea, sweating, and muscle tension  · Trouble sleeping or concentrating  What causes anxiety disorders?  Anxiety disorders tend to run in families. For some people, childhood abuse or neglect may play a role. For others, stressful life events or trauma may trigger anxiety disorders. Anxiety can trigger low self-esteem and poor coping skills.  Common anxiety disorders  · Panic disorder. This causes an intense fear of being in danger.  · Phobias. These are extreme fears of certain objects, places, or events.  · Obsessive-compulsive disorder. This causes you to have unwanted thoughts and urges. You also may perform certain actions over and over.  · Posttraumatic stress disorder. This occurs in people who have survived a terrible ordeal. It can cause nightmares and flashbacks about the event.  · Generalized anxiety disorder. This causes constant worry that can greatly disrupt your life.   Getting better  You may believe that nothing can help you. Or, you might fear what others may think. But most anxiety symptoms can be eased. Having an anxiety disorder is nothing to be ashamed of. Most people do best with treatment that combines medicine and therapy. These arent cures. But they can help you live a healthier life.  Date Last Reviewed: 2/1/2017  © 7132-4188 [employer]. [address] [place] [address], [place] [address]. All rights reserved. This information is not intended as a substitute for professional medical care. Always follow your healthcare professional's instructions.
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
## 2021-07-27 ENCOUNTER — OFFICE VISIT (OUTPATIENT)
Dept: URGENT CARE | Facility: CLINIC | Age: 26
End: 2021-07-27
Payer: MEDICAID

## 2021-07-27 VITALS
OXYGEN SATURATION: 98 % | SYSTOLIC BLOOD PRESSURE: 110 MMHG | WEIGHT: 154.44 LBS | TEMPERATURE: 98 F | HEART RATE: 67 BPM | DIASTOLIC BLOOD PRESSURE: 72 MMHG

## 2021-07-27 DIAGNOSIS — J02.9 SORE THROAT: Primary | ICD-10-CM

## 2021-07-27 LAB
CTP QC/QA: YES
SARS-COV-2 RDRP RESP QL NAA+PROBE: NEGATIVE

## 2021-07-27 PROCEDURE — 99214 PR OFFICE/OUTPT VISIT, EST, LEVL IV, 30-39 MIN: ICD-10-PCS | Mod: S$PBB,,, | Performed by: PHYSICIAN ASSISTANT

## 2021-07-27 PROCEDURE — U0002 COVID-19 LAB TEST NON-CDC: HCPCS | Mod: PBBFAC,PO | Performed by: PHYSICIAN ASSISTANT

## 2021-07-27 PROCEDURE — 99214 OFFICE O/P EST MOD 30 MIN: CPT | Mod: S$PBB,,, | Performed by: PHYSICIAN ASSISTANT

## 2021-07-27 PROCEDURE — 99213 OFFICE O/P EST LOW 20 MIN: CPT | Mod: PBBFAC,PO | Performed by: PHYSICIAN ASSISTANT

## 2021-07-27 PROCEDURE — 99999 PR PBB SHADOW E&M-EST. PATIENT-LVL III: CPT | Mod: PBBFAC,,, | Performed by: PHYSICIAN ASSISTANT

## 2021-07-27 PROCEDURE — 99999 PR PBB SHADOW E&M-EST. PATIENT-LVL III: ICD-10-PCS | Mod: PBBFAC,,, | Performed by: PHYSICIAN ASSISTANT
